# Patient Record
Sex: MALE | URBAN - METROPOLITAN AREA
[De-identification: names, ages, dates, MRNs, and addresses within clinical notes are randomized per-mention and may not be internally consistent; named-entity substitution may affect disease eponyms.]

---

## 2024-10-02 ENCOUNTER — APPOINTMENT (RX ONLY)
Dept: URBAN - METROPOLITAN AREA CLINIC 154 | Facility: CLINIC | Age: 49
Setting detail: DERMATOLOGY
End: 2024-10-02

## 2024-10-02 DIAGNOSIS — Z41.9 ENCOUNTER FOR PROCEDURE FOR PURPOSES OTHER THAN REMEDYING HEALTH STATE, UNSPECIFIED: ICD-10-CM

## 2024-10-02 PROCEDURE — ? LASER HAIR REMOVAL

## 2024-10-02 NOTE — PROCEDURE: LASER HAIR REMOVAL
Cooling: DCD setting
Total Pulses: 1357
Anesthesia Volume In Cc: 0
Eye Shield Text: Given the treatment area eye shields were inserted prior to treatment.
Spot Size: 1.5 mm
Render Post-Care In The Note: No
Fluence (Will Not Render If 0): 20
Consent: Written consent obtained, risks reviewed including but not limited to crusting, scabbing, blistering, scarring, darker or lighter pigmentary change, paradoxical hair regrowth, incomplete removal of hair and infection.
Fluence (Will Not Render If 0): 12
Post-Procedure Care: Immediate endpoint: perifollicular erythema and edema. Laser Gel or Extreme Protect were applied post laser. Post care reviewed with patient.
Tolerated Procedure (Optional): Tolerated Well
Spot Size: 18 mm
Post-Care Instructions: I reviewed with the patient in detail post-care instructions. Patient should avoid sun for a minimum of 4 weeks before and after treatment.
Topical Anesthesia Type: 10% benzocaine, 3% lidocaine, 2% tetracaine, 0.01% phenylephrine
Device Serial Number (Optional): [id number]
Pulse Duration (Include Units): 10
Pre-Procedure: Prior to proceeding the treatment areas were cleaned and all present put on their eye protection.
Laser Type: Alexandrite 755nm
Total Pulses: 16
Cooling: DCD 40/30
Cooling: DCD 30/20
Detail Level: Generalized
Location Override: full body b

## 2024-10-17 ENCOUNTER — APPOINTMENT (RX ONLY)
Dept: URBAN - METROPOLITAN AREA CLINIC 154 | Facility: CLINIC | Age: 49
Setting detail: DERMATOLOGY
End: 2024-10-17

## 2024-10-17 DIAGNOSIS — Z41.9 ENCOUNTER FOR PROCEDURE FOR PURPOSES OTHER THAN REMEDYING HEALTH STATE, UNSPECIFIED: ICD-10-CM

## 2024-10-17 PROCEDURE — ? PLATELET RICH PLASMA INJECTION

## 2024-10-17 ASSESSMENT — LOCATION DETAILED DESCRIPTION DERM: LOCATION DETAILED: RIGHT SUPERIOR MEDIAL FOREHEAD

## 2024-10-17 ASSESSMENT — LOCATION SIMPLE DESCRIPTION DERM: LOCATION SIMPLE: RIGHT FOREHEAD

## 2024-10-17 ASSESSMENT — LOCATION ZONE DERM: LOCATION ZONE: FACE

## 2024-10-17 NOTE — PROCEDURE: PLATELET RICH PLASMA INJECTION
External Cooling Fan Speed: 5
Amount Injected At This Location In Cc (Will Not Render If 0): 0
Show Additional Techniques: Yes
Post-Care Instructions: After the procedure, take precautions agains sun exposure. Do not apply sunscreen for 12 hours after the procedure. Do not apply make-up for 12 hours after the procedure. Avoid alcohol based toners for 10-14 days. After 2-3 days patients can return to their regular skin regimen.
Amount Injected At This Location In Cc (Will Not Render If 0): 0.5
Which Technique?: Default
Consent: Written consent obtained, risks reviewed including but not limited to pain, scarring, infection and incomplete improvement.  Patient understands the procedure is cosmetic in nature and will require out of pocket payment.
Depth In Mm (Will Not Render If 0): 0.1
Depth In Mm (Will Not Render If 0): 2
Detail Level: Zone
Anesthesia Type: 1% lidocaine with epinephrine
Depth In Mm (Will Not Render If 0): 1
Venipuncture Paragraph: An alcohol pad was applied to the venipuncture site. Venipuncture was performed using a butterfly needle. Pressure and a bandaid was applied to the site. No complications were noted.
Location #1: scalp